# Patient Record
Sex: FEMALE | Race: ASIAN | NOT HISPANIC OR LATINO | URBAN - METROPOLITAN AREA
[De-identification: names, ages, dates, MRNs, and addresses within clinical notes are randomized per-mention and may not be internally consistent; named-entity substitution may affect disease eponyms.]

---

## 2023-10-21 ENCOUNTER — EMERGENCY (EMERGENCY)
Facility: HOSPITAL | Age: 28
LOS: 1 days | Discharge: HOME | End: 2023-10-21
Admitting: EMERGENCY MEDICINE
Payer: MEDICAID

## 2023-10-21 VITALS
SYSTOLIC BLOOD PRESSURE: 104 MMHG | HEART RATE: 77 BPM | TEMPERATURE: 98 F | OXYGEN SATURATION: 100 % | DIASTOLIC BLOOD PRESSURE: 73 MMHG | RESPIRATION RATE: 18 BRPM

## 2023-10-21 DIAGNOSIS — R53.1 WEAKNESS: ICD-10-CM

## 2023-10-21 PROCEDURE — 99283 EMERGENCY DEPT VISIT LOW MDM: CPT

## 2023-10-21 NOTE — ED ADULT TRIAGE NOTE - CAS ELECT INFOMATION PROVIDED
pt refused care and fingerstick. provided with nourishment. agreeable to discharge, ambulatory with steady gait./DC instructions

## 2023-10-21 NOTE — ED PROVIDER NOTE - OBJECTIVE STATEMENT
A 28-year-old female, no medical history who presents this emergency room for generalized weakness that has resolved.  Patient states that she did not eat breakfast this morning, and was in a shopping store, when she felt very weak and had to sit down.  The people in the store called 911, patient was transported here.  Patient states that she feels much better now that she ate something.  Denies chest pain, shortness breath, dumping, nausea, vomiting, diarrhea, constipation, fevers, chills, any symptoms currently.  Is requesting to be discharged after some food.

## 2023-10-21 NOTE — ED PROVIDER NOTE - CLINICAL SUMMARY MEDICAL DECISION MAKING FREE TEXT BOX
Yes 28-year-old female presents this emergency department for weakness, however feels much better after eating food  No fingerstick was done prior to her episode, and arrived on the stretcher eating.  States that she is requesting shoulder and to be discharged.  Vital signs are stable and physical lungs grossly unremarkable.  Patient understands the risks of being discharged prior to a work-up.  Patient stable for discharge  Also to the patient.  Patient or stands agrees with plan.  Agreed to follow-up with primary care doctor in 2 to 3 days.

## 2023-10-21 NOTE — ED PROVIDER NOTE - PATIENT PORTAL LINK FT
You can access the FollowMyHealth Patient Portal offered by [facility] by registering at the following website: [email or web address]. By joining [facility]’s FollowMyHealth portal, you will also be able to view your health information using other applications (apps) compatible with our system.

## 2023-10-21 NOTE — ED PROVIDER NOTE - PHYSICAL EXAMINATION
General: well developed, well nourished, no distress  Eye: bilateral: PERRL, EOMI  Ears, Nose, Throat: normal pharynx, TMs normal, membranes moist.  Neck: non-tender, full range of motion, supple.  Negative For: lymphadenopathy (R), lymphadenopathy (L)  Respiratory: CTAB.  Cardiovascular: S1-S2 normal, regular rate, regular rhythm.  Abdomen: normal bowel sounds, non tender, soft.    Genitourinary: Negative For: CVA tenderness  Musculoskeletal: normal gait.  Negative For: back pain, upper, back pain  Extremities: normal range of motion, non-tender.  Negative For: edema (R), edema (L), calf tenderness (R), calf tenderness (L), swelling  Extremity Strength: upper extremities equal bilateral: 5/5, lower extremities equal bilateral: 5/5  Neurologic: alert, oriented to person, oriented to place, oriented to time.    Skin: normal color.  Negative For: rash  Psychiatric: normal affect, normal insight, normal concentration  No nuchal rigidity, negative Brudzinski’s and Kernig’s signs. Pt is neurologically intact, no focal neurologic deficits.  GCS = 15. No raccoon eyes/carr signs/hemotympanum noted. No focal tenderness on spinous processes of C-spine. Finger-to-nose negative, heel-shin negative. Gait steady. NIH scale - 0.

## 2023-10-21 NOTE — ED ADULT TRIAGE NOTE - CHIEF COMPLAINT QUOTE
pt clyde stating that she laid down on the floor in a store because she was tired and hadn't eaten breakfast. denies medical complaint, states that she would just like to rest.

## 2023-10-21 NOTE — ED PROVIDER NOTE - NSFOLLOWUPINSTRUCTIONS_ED_ALL_ED_FT
Your Care Instructions  Weakness is a lack of physical or muscle strength. You may feel that you need to make extra effort to move your arms, legs, or other muscles. Generalized weakness means that you feel weak in most areas of your body. Another type of weakness may affect just one muscle or group of muscles.    You may feel weak and tired after you have done too much activity, such as taking an extra-long hike. This is not a serious problem. It often goes away on its own.    Feeling weak can also be caused by medical conditions like thyroid problems, depression, or a virus. Sometimes the cause can be serious. Your doctor may want to do more tests to try to find the cause of the weakness.    The doctor has checked you carefully, but problems can develop later. If you notice any problems or new symptoms, get medical treatment right away.    Follow-up care is a key part of your treatment and safety. Be sure to make and go to all appointments, and call your doctor or nurse advice line (607 in most provinces and [place]) if you are having problems. It's also a good idea to know your test results and keep a list of the medicines you take.    How can you care for yourself at home?  Rest when you feel tired.  Be safe with medicines. If your doctor prescribed medicine, take it exactly as prescribed. Call your doctor or nurse advice line if you think you are having a problem with your medicine. You will get more details on the specific medicines your doctor prescribes.  Do not skip meals. Eating a balanced diet may increase your energy level.  Get some physical activity every day, but do not get too tired.  When should you call for help?  	  Call your doctor or nurse advice line now or seek immediate medical care if:    You have new or worse weakness.  You are dizzy or light-headed, or you feel like you may faint.  Watch closely for changes in your health, and be sure to contact your doctor or nurse advice line if:    You do not get better as expected.